# Patient Record
Sex: FEMALE | Race: WHITE
[De-identification: names, ages, dates, MRNs, and addresses within clinical notes are randomized per-mention and may not be internally consistent; named-entity substitution may affect disease eponyms.]

---

## 2017-01-27 ENCOUNTER — HOSPITAL ENCOUNTER (OUTPATIENT)
Dept: HOSPITAL 84 - D.LDO | Age: 27
Discharge: HOME | End: 2017-01-27
Attending: OBSTETRICS & GYNECOLOGY
Payer: OTHER GOVERNMENT

## 2017-01-27 VITALS — BODY MASS INDEX: 19.3 KG/M2

## 2017-01-27 DIAGNOSIS — O36.8130: Primary | ICD-10-CM

## 2017-01-27 DIAGNOSIS — Z3A.38: ICD-10-CM

## 2017-01-27 LAB
APPEARANCE UR: CLEAR
BILIRUB SERPL-MCNC: NEGATIVE MG/DL
COLOR UR: YELLOW
GLUCOSE SERPL-MCNC: 100 MG/DL
KETONES UR STRIP-MCNC: NEGATIVE MG/DL
LEUKOCYTE ESTERASE: NEGATIVE
NITRITE UR-MCNC: NEGATIVE MG/ML
PH UR STRIP: 5 [PH] (ref 5–6)
PROT UR-MCNC: NEGATIVE MG/DL
SP GR UR STRIP: 1.02 (ref 1–1.02)
UROBILINOGEN UR-MCNC: NORMAL MG/DL

## 2017-02-09 ENCOUNTER — HOSPITAL ENCOUNTER (INPATIENT)
Dept: HOSPITAL 84 - D.LD | Age: 27
LOS: 1 days | Discharge: HOME | End: 2017-02-10
Attending: OBSTETRICS & GYNECOLOGY | Admitting: OBSTETRICS & GYNECOLOGY
Payer: OTHER GOVERNMENT

## 2017-02-09 VITALS — HEIGHT: 67 IN | BODY MASS INDEX: 28.47 KG/M2 | WEIGHT: 181.38 LBS

## 2017-02-09 VITALS — SYSTOLIC BLOOD PRESSURE: 116 MMHG | DIASTOLIC BLOOD PRESSURE: 67 MMHG

## 2017-02-09 VITALS — DIASTOLIC BLOOD PRESSURE: 62 MMHG | SYSTOLIC BLOOD PRESSURE: 109 MMHG

## 2017-02-09 DIAGNOSIS — Z87.410: ICD-10-CM

## 2017-02-09 DIAGNOSIS — Z3A.39: ICD-10-CM

## 2017-02-09 DIAGNOSIS — O32.6XX0: ICD-10-CM

## 2017-02-09 LAB
APPEARANCE UR: (no result)
BACTERIA #/AREA URNS HPF: (no result) /HPF
BILIRUB SERPL-MCNC: NEGATIVE MG/DL
COLOR UR: YELLOW
ERYTHROCYTE [DISTWIDTH] IN BLOOD BY AUTOMATED COUNT: 17.6 % (ref 11.5–14.5)
GLUCOSE SERPL-MCNC: NEGATIVE MG/DL
HCT VFR BLD CALC: 34.6 % (ref 36–48)
HGB BLD-MCNC: 10.4 G/DL (ref 12–16)
KETONES UR STRIP-MCNC: NEGATIVE MG/DL
LEUKOCYTE ESTERASE: (no result)
MCH RBC QN AUTO: 26.1 PG (ref 26–34)
MCHC RBC AUTO-ENTMCNC: 30.1 G/DL (ref 31–37)
MCV RBC: 86.7 FL (ref 80–100)
MUCOUS THREADS #/AREA URNS LPF: (no result) /LPF
NITRITE UR-MCNC: NEGATIVE MG/ML
PH UR STRIP: 5.5 [PH] (ref 5–6)
PLATELET # BLD: 219 10X3/UL (ref 130–400)
PMV BLD AUTO: 10.3 FL (ref 7.4–10.4)
PROT UR-MCNC: NEGATIVE MG/DL
RBC # BLD AUTO: 3.99 10X6/UL (ref 4–5.4)
RBC #/AREA URNS HPF: (no result) /HPF (ref 0–5)
SP GR UR STRIP: 1.02 (ref 1–1.02)
UROBILINOGEN UR-MCNC: NORMAL MG/DL
WBC # BLD AUTO: 10.8 10X3/UL (ref 4.8–10.8)
WBC #/AREA URNS HPF: (no result) /HPF (ref 0–5)

## 2017-02-09 NOTE — NUR
PT ON CALL LIGHT, PT NEEDING TO GET UP TO VOID, ASSESSMENT PER FLOW SHEET, VS
OBTAINED, FF, ML, U/U, PT UP TO BR WITH ASSISTANCE, GAIT STEADY, DENIES ANY
DIZZYNESS OR LIGHTHEADEDNESS, PT INST ON, ASSISTED WITH, AND VERBALIZES
UNDERSTANDING OF JOANA CARE, ASSISTED PT WITH JOANA PAD, PANTIES AND
BREASTFEEDING GOWN, PT DENIES FLATUS, PT BACK TO BED, C/O CRAMPING, INFORMED
PT THAT I WILL ADM MOTRIN, PT REQUESTED TO TAKE IT WITH APPLESAUCE, PT DENIES
FURTHER NEEDS, FOB HOLDING BABY, OTHER CHILD AND FAMILY IN ROOM

## 2017-02-09 NOTE — NUR
PT HOLDING BABY, PT INFORMS ME SHE HAS BEEN TRYING TO GET THE NSY NURSE
BECAUSE BABY HAS TO HAVE ITS BLOOD SUGAR CHECKED BEFORE FEEDING, INFORMED PT
THAT THE NSY NURSE WAS IN A DELIVERY AT THIS TIME, BUT SHOULD BE OUT SHORTLY,
INFORMED PT THAT I WILL INFORM THE NSY NURSE AS SOON AS SHE IS OUT OF THE
DELIVERY, PT VERBALIZES UNDERSTANDING, DENIES FURTHER NEEDS OR PAIN AT THIS
TIME, FOB AT BEDSIDE

## 2017-02-09 NOTE — NUR
PM ROUNDS MADE, PT VISITING WITH A ROOM FULL OF FAMILY AND FRIENDS, INFORMED
PT THAT I WILL COME BACK TO DO ASSESSMENT, PT STATES "THANK YOU", PT DENIES
NEEDS OR PAIN AT THIS TIME

## 2017-02-09 NOTE — NUR
PT JUST FINISHED BREASTFEEDING, FOB SWADDLING BABY, ADM MOM PO PER MD ORDERS,
PT RATES JOANA PAIN 2/10, REFUSES PAIN MED AT THIS TIME, INST PT TO USE CALL
LIGHT WHEN AND IF SHE DECIDES TO TAKE ANY, BEDDING PROIVDED TO FOB, REQUESTED
AND SERVED FRESH H20, DENIES FURTHER NEEDS

## 2017-02-10 VITALS — SYSTOLIC BLOOD PRESSURE: 112 MMHG | DIASTOLIC BLOOD PRESSURE: 67 MMHG

## 2017-02-10 LAB
BASOPHILS NFR BLD AUTO: 0.3 % (ref 0–2)
EOSINOPHIL NFR BLD: 0.5 % (ref 0–7)
ERYTHROCYTE [DISTWIDTH] IN BLOOD BY AUTOMATED COUNT: 17.9 % (ref 11.5–14.5)
HCT VFR BLD CALC: 30.8 % (ref 36–48)
HGB BLD-MCNC: 9.3 G/DL (ref 12–16)
IMM GRANULOCYTES NFR BLD: 0.6 % (ref 0–5)
LYMPHOCYTES NFR BLD AUTO: 16.3 % (ref 15–50)
MCH RBC QN AUTO: 26 PG (ref 26–34)
MCHC RBC AUTO-ENTMCNC: 30.2 G/DL (ref 31–37)
MCV RBC: 86 FL (ref 80–100)
MONOCYTES NFR BLD: 7.3 % (ref 2–11)
NEUTROPHILS NFR BLD AUTO: 75 % (ref 40–80)
PLATELET # BLD: 189 10X3/UL (ref 130–400)
PMV BLD AUTO: 10.4 FL (ref 7.4–10.4)
RBC # BLD AUTO: 3.58 10X6/UL (ref 4–5.4)
WBC # BLD AUTO: 13.4 10X3/UL (ref 4.8–10.8)

## 2017-02-10 NOTE — NUR
DISCUSSED DISCHARGE INSTRUCTIONS WITH HER. INCLUDED PERICARE, MEDICATIONS,
FOLLOW UP APPT, AND PELVIC REST. PRESCRIPTIONS GIVEN. SHE IS BREAST FEEDING
HER INFANT AND WISHES TO FINISHE THISTASK BEFORE LEAVING. ENCOURAGED HER TO
LET US KNOW WHEN SHE IS READY SO THAT WE CAN WHEEL HER OUT. SHE IS WITHOUT
EXPRESSED NEEDS. FOB AND TODDLER AT THE BEDSIDE.

## 2017-02-10 NOTE — NUR
PT RESTING WITH EYES CLOSED, RESP QUIET, NO DISTRESS NOTED, LEFT UNDISTURBED
AT THIS TIME, FOB ASLEEP ON COUCH

## 2017-02-10 NOTE — NUR
ABHIJEET SITTING UP IN HER BED, HOB UP 45 DEGREES. SHE IS BREAST FEEDING HER
INFANT. SHE STATE SHTAT SHE ISN'T NEARLY AS SORE AS SHE'D EXPECTED AND RATES
HER CRAMPING PAIN A 4. SHE WOULD LIKE AN IBUPROFEN, CRUSHED IN PUDDING BUT
WOULD LIKE TO WAIT UNTIL AFTER SHE HAS FINISHED FEEDING HER BABY. SHE STATES
THAT HER BLEEDING IS LIKE THAT OF A HEAVY PERIOD. HER CALL LIGHT IS WITHIN HER
REACH. SHE DENIES HAVING ANY NEEDS AT THIS TIME.

## 2017-02-10 NOTE — NUR
Torri Kitchen
 
02/10/17
 
LE@ 9:15
 
S: Patient states breastfeeding is going great, infant just started to nurse
not to long ago, open to me looking at baby latches.
 
O: Patient sitting up in bed, breastfeeding infant. Observed infant nursing on
left breast. Infant checks around, mouth 140 degrees, round cheeks, sucking in
a rocking motion. Infant wasn't turned  tummy to tummy, more upright, head
turned. . Suggested to turn infant, patient did. Explain how to verify infant
is latched correctly  Asked patient how is breastfeeding, any questions or
concern? Client states breastfeeding is good, she did breastfeed her other
baby.  Encouraged to continue to latch infant for every feeding, explain
feeding cues, benefits of skin to skin. Breastfeeding takes time and patience
in the beginning, the more infant is placed to the breast, her body will
increase, they amount of milk needed for infant. Provided and explain handout
on waking a sleeping baby, positions for breastfeeding, what to expect the
first week, engorgement during breastfeeding, and hand expression. Asked if
she gets WIC, patient states no, not sure if she wants to. Provided fact sheet
on how to qualify. Will follow up, asked if any needs or concerns, client
declined.
 
 
A: Patient appears confident with breastfeeding.
 
P: Continue to support exclusively breastfeeding.
 
Cara Shirley, CLC

## 2017-02-10 NOTE — NUR
PLEASE NOTE THAT ERROR IN DOCUMENTATION OCCURRED REGARDING THE TIME OUT AT
0332 PREVIOUSLY DOCUMENTED. CHARTING WAS PERFORMED ON WRONG PT.

## 2017-02-10 NOTE — NUR
PATIENT RESTING QUIELTY WITH EYES CLOSED. NO NEEDS NOTED, DID NOT DISTURB.
RESPIRATIONS DEEP AND EVEN

## 2017-02-10 NOTE — NUR
PATIENT SITTING UP IN HER BED VISITING WITH VISITORS. SHE IS HAPPY TO BE GOING
HOME TODAY PER HER STATE STATEMENT.

## 2017-02-10 NOTE — NUR
PATIENT RESTING BACK IN HER BED AFTER TAKING A SHOWER. SHE STATES THAT HER
CRAMPS ARE BETTER AND REALLY ONLY ARISE WHEN SHE IS BREAST FEEDING. FOB IS IN
THE BEDSIDE CHAIR HOLDING INFANT. PATIENT IS SMILING AND APPEARS HAPPY. DENIES
NEEDS.

## 2017-02-10 NOTE — NUR
ABHIJEET VSS. IV SL REMOVED FROM THE LEFT WRIST, CATHETER FULLY INTACT. LINENS
PROVIDED FOR ABHIJEET TO SHOWER PER REQUEST. HER FUNDUS IS FIRM, MODLINE AND
U/2. SHE STATES THAT THAT HER BLEEDING IS LIKE THAT OF A HEAVY PERIOD.
ENCOURAGED HER TO PERIODICALLY MASSAGE HER URERUS GENTLY TO ENCOURAGE
FIRMNESS. SHE STATES THAT CRAMPING DOES OCCUR DURING BREASTFEEDING. MOTRIN
GIVEN FOR DISCOMFORT. SHE DECLINES ANYTHING STRONGER AT THIS TIME.

## 2017-02-10 NOTE — NUR
PATIENT IS RESTING QUIETLY IN HER ROOM WITH THE LIGHTS OUT AND TV ON, VOLUME
LOW. CRADLING HER INFANT AND LOOKING AT HER PHONE. SHE DENIES NEEDS. STATES
LUNCH WAS GOOD AND DENIES DISCOMFORT. CALL LIGHT IS WITHIN HER REACH.
ENCOURAGED HER TO CALL FOR ANY ASSISTANCE.

## 2018-06-18 NOTE — NUR
PATIENT SITTING UP WITH VISITORS AT THE BEDSIDE. SHE DENIES NEEDS/ PAIN. Pt has made appt for next Monday, 6/25/18

## 2019-11-12 ENCOUNTER — HOSPITAL ENCOUNTER (EMERGENCY)
Dept: HOSPITAL 84 - D.ER | Age: 29
Discharge: HOME | End: 2019-11-12
Payer: OTHER GOVERNMENT

## 2019-11-12 VITALS — WEIGHT: 185.39 LBS | HEIGHT: 67 IN | BODY MASS INDEX: 29.1 KG/M2

## 2019-11-12 VITALS — DIASTOLIC BLOOD PRESSURE: 71 MMHG | SYSTOLIC BLOOD PRESSURE: 130 MMHG

## 2019-11-12 DIAGNOSIS — H53.122: Primary | ICD-10-CM

## 2019-11-12 DIAGNOSIS — R51: ICD-10-CM
